# Patient Record
Sex: FEMALE | Race: BLACK OR AFRICAN AMERICAN | URBAN - METROPOLITAN AREA
[De-identification: names, ages, dates, MRNs, and addresses within clinical notes are randomized per-mention and may not be internally consistent; named-entity substitution may affect disease eponyms.]

---

## 2018-02-09 ENCOUNTER — HOSPITAL ENCOUNTER (OUTPATIENT)
Dept: LAB | Age: 23
Discharge: HOME | End: 2018-02-09
Payer: COMMERCIAL

## 2018-02-09 ENCOUNTER — IMPORTED ENCOUNTER (OUTPATIENT)
Dept: URBAN - METROPOLITAN AREA CLINIC 1 | Facility: CLINIC | Age: 23
End: 2018-02-09

## 2018-02-09 ENCOUNTER — OFFICE VISIT (OUTPATIENT)
Dept: OBGYN CLINIC | Age: 23
End: 2018-02-09

## 2018-02-09 VITALS
HEART RATE: 94 BPM | HEIGHT: 67 IN | BODY MASS INDEX: 40.98 KG/M2 | OXYGEN SATURATION: 100 % | WEIGHT: 261.1 LBS | TEMPERATURE: 97.9 F | SYSTOLIC BLOOD PRESSURE: 125 MMHG | DIASTOLIC BLOOD PRESSURE: 81 MMHG | RESPIRATION RATE: 18 BRPM

## 2018-02-09 DIAGNOSIS — Z11.3 SCREEN FOR STD (SEXUALLY TRANSMITTED DISEASE): ICD-10-CM

## 2018-02-09 DIAGNOSIS — Z01.419 ENCOUNTER FOR WELL WOMAN EXAM WITH ROUTINE GYNECOLOGICAL EXAM: Primary | ICD-10-CM

## 2018-02-09 DIAGNOSIS — Z87.820 H/O CONCUSSION: ICD-10-CM

## 2018-02-09 PROBLEM — H04.123: Noted: 2018-02-09

## 2018-02-09 PROBLEM — H40.013: Noted: 2018-02-09

## 2018-02-09 PROBLEM — G43.909: Noted: 2018-02-09

## 2018-02-09 PROCEDURE — 87491 CHLMYD TRACH DNA AMP PROBE: CPT | Performed by: OBSTETRICS & GYNECOLOGY

## 2018-02-09 PROCEDURE — 88175 CYTOPATH C/V AUTO FLUID REDO: CPT | Performed by: OBSTETRICS & GYNECOLOGY

## 2018-02-09 PROCEDURE — 86592 SYPHILIS TEST NON-TREP QUAL: CPT | Performed by: OBSTETRICS & GYNECOLOGY

## 2018-02-09 PROCEDURE — 92015 DETERMINE REFRACTIVE STATE: CPT

## 2018-02-09 PROCEDURE — 87389 HIV-1 AG W/HIV-1&-2 AB AG IA: CPT | Performed by: OBSTETRICS & GYNECOLOGY

## 2018-02-09 PROCEDURE — 87340 HEPATITIS B SURFACE AG IA: CPT | Performed by: OBSTETRICS & GYNECOLOGY

## 2018-02-09 PROCEDURE — 92250 FUNDUS PHOTOGRAPHY W/I&R: CPT

## 2018-02-09 PROCEDURE — 92014 COMPRE OPH EXAM EST PT 1/>: CPT

## 2018-02-09 NOTE — PATIENT INSTRUCTIONS
Well Visit, Ages 25 to 48: Care Instructions  Your Care Instructions    Physical exams can help you stay healthy. Your doctor has checked your overall health and may have suggested ways to take good care of yourself. He or she also may have recommended tests. At home, you can help prevent illness with healthy eating, regular exercise, and other steps. Follow-up care is a key part of your treatment and safety. Be sure to make and go to all appointments, and call your doctor if you are having problems. It's also a good idea to know your test results and keep a list of the medicines you take. How can you care for yourself at home? · Reach and stay at a healthy weight. This will lower your risk for many problems, such as obesity, diabetes, heart disease, and high blood pressure. · Get at least 30 minutes of physical activity on most days of the week. Walking is a good choice. You also may want to do other activities, such as running, swimming, cycling, or playing tennis or team sports. Discuss any changes in your exercise program with your doctor. · Do not smoke or allow others to smoke around you. If you need help quitting, talk to your doctor about stop-smoking programs and medicines. These can increase your chances of quitting for good. · Talk to your doctor about whether you have any risk factors for sexually transmitted infections (STIs). Having one sex partner (who does not have STIs and does not have sex with anyone else) is a good way to avoid these infections. · Use birth control if you do not want to have children at this time. Talk with your doctor about the choices available and what might be best for you. · Protect your skin from too much sun. When you're outdoors from 10 a.m. to 4 p.m., stay in the shade or cover up with clothing and a hat with a wide brim. Wear sunglasses that block UV rays. Even when it's cloudy, put broad-spectrum sunscreen (SPF 30 or higher) on any exposed skin.   · See a dentist one or two times a year for checkups and to have your teeth cleaned. · Wear a seat belt in the car. · Drink alcohol in moderation, if at all. That means no more than 2 drinks a day for men and 1 drink a day for women. Follow your doctor's advice about when to have certain tests. These tests can spot problems early. For everyone  · Cholesterol. Have the fat (cholesterol) in your blood tested after age 21. Your doctor will tell you how often to have this done based on your age, family history, or other things that can increase your risk for heart disease. · Blood pressure. Have your blood pressure checked during a routine doctor visit. Your doctor will tell you how often to check your blood pressure based on your age, your blood pressure results, and other factors. · Vision. Talk with your doctor about how often to have a glaucoma test.  · Diabetes. Ask your doctor whether you should have tests for diabetes. · Colon cancer. Have a test for colon cancer at age 48. You may have one of several tests. If you are younger than 48, you may need a test earlier if you have any risk factors. Risk factors include whether you already had a precancerous polyp removed from your colon or whether your parent, brother, sister, or child has had colon cancer. For women  · Breast exam and mammogram. Talk to your doctor about when you should have a clinical breast exam and a mammogram. Medical experts differ on whether and how often women under 50 should have these tests. Your doctor can help you decide what is right for you. · Pap test and pelvic exam. Begin Pap tests at age 24. A Pap test is the best way to find cervical cancer. The test often is part of a pelvic exam. Ask how often to have this test.  · Tests for sexually transmitted infections (STIs). Ask whether you should have tests for STIs. You may be at risk if you have sex with more than one person, especially if your partners do not wear condoms.   For men  · Tests for sexually transmitted infections (STIs). Ask whether you should have tests for STIs. You may be at risk if you have sex with more than one person, especially if you do not wear a condom. · Testicular cancer exam. Ask your doctor whether you should check your testicles regularly. · Prostate exam. Talk to your doctor about whether you should have a blood test (called a PSA test) for prostate cancer. Experts differ on whether and when men should have this test. Some experts suggest it if you are older than 39 and are -American or have a father or brother who got prostate cancer when he was younger than 72. When should you call for help? Watch closely for changes in your health, and be sure to contact your doctor if you have any problems or symptoms that concern you. Where can you learn more? Go to http://arpit-john.info/. Enter P072 in the search box to learn more about \"Well Visit, Ages 25 to 48: Care Instructions. \"  Current as of: May 12, 2017  Content Version: 11.4  © 8721-0862 [employer]. Care instructions adapted under license by [employer] (which disclaims liability or warranty for this information). If you have questions about a medical condition or this instruction, always ask your healthcare professional. [place] [address] any warranty or liability for your use of this information. Postconcussion Syndrome: Care Instructions  Your Care Instructions    Postconcussion syndrome occurs after a blow to the head or body. Common symptoms are changes in the ability to concentrate, think, remember, or solve problems. Symptoms, which may include headaches, personality changes, and dizziness, may be related to stress from the events surrounding the accident that caused the injury. Follow-up care is a key part of your treatment and safety.  Be sure to make and go to all appointments, and call your doctor if you are having problems. It's also a good idea to know your test results and keep a list of the medicines you take. How can you care for yourself at home? Pain  · Rest is the best treatment for postconcussion syndrome. · Do not drive if you have taken a prescription pain medicine. · Rest in a quiet, dark room until your headache is gone. Close your eyes and try to relax or go to sleep. Do not watch TV or read. · Put a cold, moist cloth or cold pack on the painful area for 10 to 20 minutes at a time. Put a thin cloth between the cold pack and your skin. · Have someone gently massage your neck and shoulders. · Take your medicines exactly as prescribed. Call your doctor if you think you are having a problem with your medicine. You will get more details on the specific medicines your doctor prescribes. Stress  · Try to reduce stress. Some ways to do this include:  ¨ Taking slow, deep breaths. ¨ Soaking in a warm bath. ¨ Listening to soothing music. ¨ Taking a yoga class. ¨ Having a massage or back rub. ¨ Drinking a warm, nonalcoholic, noncaffeinated beverage. · Get enough sleep. · Eat a healthy, balanced diet. A balanced diet includes whole grains, dairy, fruits and vegetables, and protein. Eat a variety of foods from each of those groups so you get all the nutrients you need. · Avoid alcohol and illegal drugs. · Try relaxation exercises, such as breathing and muscle relaxation exercises. · Talk to your doctor about counseling. It may help you deal with stress from your accident. When should you call for help? Watch closely for changes in your health, and be sure to contact your doctor if:  ? · You do not get better as expected. ? · Your symptoms, such as headaches, trouble concentrating, or changes in mood, get worse. Where can you learn more? Go to http://arpit-john.info/. Enter R633 in the search box to learn more about \"Postconcussion Syndrome: Care Instructions. \"  Current as of: October 14, 2016  Content Version: 11.4  © 2328-5441 [employer]. Care instructions adapted under license by [employer] (which disclaims liability or warranty for this information). If you have questions about a medical condition or this instruction, always ask your healthcare professional. [place] [address] any warranty or liability for your use of this information.

## 2018-02-09 NOTE — PROGRESS NOTES
Name: Pedro Méndez MRN: [id number] No obstetric history on file. YOB: 1995  Age: 23 y.o. Sex: female        Chief Complaint   Patient presents with    Well Woman     with STD testing       HPI  Denies depression, states she used to feel depressed, but started praying and going [employer], and feels better. Denies SI/HI  Does not eat a well balanced diet. She started a pescatarian diet this week, and is actively trying to loose weight. Does not exercise, but starting to count her steps. Has new watch. Denies domestic abuse   Last tdap   Flu vaccine discussed, declined. States the last one in  made her sick  Pt on Nuvaring since 2017, has had regular periods prior to the [address]. She states her dysmenorrhea and HMB are better now that she is on the nuvaring. H/o Brain Concussion: pt states she fell down a flight of stairs 2017 and hit her head. She was supposed to follow up with a nuerologist afterwards but was loss to follow up. She  Now c/o intermittent headaches. And pain and swelling in the area where she hit her heard. She is trying to find a nuerologist to see. Denies n/v, vision changes, numbness or tingling in the hands or feet or weakness in her extremities. Pt also desires STD testing.      OB History      Para Term  AB Living    2    2     SAB TAB Ectopic Molar Multiple Live Births     0            Obstetric Comments    Patient's last menstrual period was 2017 (approximate) on Nuvaring  Periods regular, last 7 days, flow heavy, severe dysmenorrhea  History of sexually transmitted infections: h/o GC  s/p FILOMENA  Last pap 2017 per pt was wnl             History   Sexual Activity    Sexual activity: Not Currently    Partners: Male    Birth control/ protection: Inserts     Comment: nuvaring       Past Medical History:   Diagnosis Date    Concussion 2017    GERD (gastroesophageal reflux disease)        Past Surgical History:   Procedure Laterality Date    HX DILATION AND CURETTAGE  2016    surgical     HX WISDOM TEETH EXTRACTION      REV UPPER EYELID W EXCESS SKIN  2017    removal of chalazion       No Known Allergies    No current outpatient prescriptions on file prior to visit. No current facility-administered medications on file prior to visit. Social History     Social History    Marital status: SINGLE     Spouse name: N/A    Number of children: N/A    Years of education: N/A     Occupational History    Not on file. Social History Main Topics    Smoking status: Never Smoker    Smokeless tobacco: Never Used    Alcohol use Yes    Drug use: Yes     Special: Marijuana      Comment: last used 16    Sexual activity: Not Currently     Partners: Male     Birth control/ protection: Inserts      Comment: nuvaring     Other Topics Concern    Not on file     Social History Narrative    No narrative on file       Family History   Problem Relation Age of Onset    Asthma Brother     Hypertension Maternal Grandmother     Stroke Paternal Grandmother     Hypertension Paternal Grandmother     Cancer Paternal Grandmother 76     breast       Review of Systems   Constitutional: Negative. HENT: Negative. Eyes: Negative. Respiratory: Negative. Cardiovascular: Negative. Gastrointestinal: Negative. Genitourinary: Negative. Musculoskeletal: Negative. Neurological: Negative. Endo/Heme/Allergies: Negative. Visit Vitals    /81 (BP 1 Location: Left arm, BP Patient Position: Sitting)    Pulse 94    Temp 97.9 °F (36.6 °C) (Oral)    Resp 18    Ht 5' 7\" (1.702 m)    Wt 261 lb 1.6 oz (118.4 kg)    LMP 2017 (Approximate)    SpO2 100%    BMI 40.89 kg/m2       GENERAL:  Well developed, well nourished, in no distress  NEURO/PSYCHE: Grossly intact, normal mood and affect  HEENT: Normal cephalic, atraumatic, good dentition, neck supple.  No thyromegaly  BREASTS: breasts appear normal, no suspicious masses, no skin or nipple changes  CV: regular rate and rhythm  LUNGS: clear to auscultation bilaterally, no wheezes, rhonchi or rales, good air entry with normal effort  ABDOMEN: + BS, soft without tenderness, no guarding, rebound or masses  EXTREMITIES: no edema or erythema noted  SKIN:  Warm, dry, no lesions      PELVIC EXAM:  LABIA MAJORA: no masses, tenderness or lesions  LABIA MINORA: no masses, tenderness or lesions  CLITORIS: no masses, tenderness or lesions  URETHRA: normal appearing, no masses or tenderness  BLADDER: no fullness or tenderness  VAGINA: pink appearing vagina with physiologic discharge, no lesions   PERINEUM: no masses, tenderness or lesions  CERVIX: No CMT or lesions  UTERUS: small, mobile, nontender  ADNEXA: nontender and no masses    1. Encounter for well woman exam with routine gynecological exam  - PAP IG, CT-NG TV [address] HPV [id number], [id number]); Future  Reviewed diet, weight loss, exercise, and domestic abuse. Encouraged condom use every time. Reviewed tetanus vaccine. All of her questions were discussed. 2. Screen for STD (sexually transmitted disease)    - PAP IG, CT-NG TV [address] HPV [id number], [id number]); Future  - HEP B SURFACE AG; Future  - HIV 1/2 AG/AB, 4TH GENERATION,W RFLX CONFIRM; Future  - RPR; Future    3.  H/O concussion  Will refer to PCP    F/U 2weeks prn  Will call patient if results abnormal

## 2018-02-09 NOTE — PATIENT DISCUSSION
1.  Migraine w/ Aura - Reassurance and explanation was given to patient. Patient recently fell and hit head diagnosed with concussion. Had a CT Scan never had follow up with Neuro. Advised to follow up with Neurologist and PCP. Advised patient of migraine triggers such as stress caffeine dietary components such as MSG. No signs of ocular damage noted on today's exam. 2.  Glaucoma Suspect OU (CD 0.65/0.60): IOP 14 OU. Unknown family hx. Patient is considered Low Risk. Condition was discussed with patient and patient understands. Will continue to monitor patient for any progression in condition. Patient was advised to call us with any problems questions or concerns. 3.  Dry Eyes OU -- Recommended to patient to use Artificial Tears BID OURefraction fee discussed and pt agrees to non-covered serviceReturn for an appointment in 1 month 10/OCT/HVF with Dr. Franny Lopez.

## 2018-02-10 LAB — RPR SER QL: NONREACTIVE

## 2018-02-12 ENCOUNTER — TELEPHONE (OUTPATIENT)
Dept: OBGYN CLINIC | Age: 23
End: 2018-02-12

## 2018-02-12 LAB
C TRACH RRNA SPEC QL NAA+PROBE: POSITIVE
HBV SURFACE AG SER QL: <0.1 INDEX
HBV SURFACE AG SER QL: NEGATIVE
HIV 1+2 AB+HIV1 P24 AG SERPL QL IA: NONREACTIVE
HIV12 RESULT COMMENT, HHIVC: NORMAL
N GONORRHOEA RRNA SPEC QL NAA+PROBE: NEGATIVE
SPECIMEN SOURCE: ABNORMAL
T VAGINALIS RRNA SPEC QL NAA+PROBE: NEGATIVE

## 2018-02-12 NOTE — TELEPHONE ENCOUNTER
MsOmar Nils Flores called about her [facility] medication. She called the pharmacy and they did not have the order.

## 2019-02-19 ENCOUNTER — IMPORTED ENCOUNTER (OUTPATIENT)
Dept: URBAN - METROPOLITAN AREA CLINIC 1 | Facility: CLINIC | Age: 24
End: 2019-02-19

## 2019-02-19 PROBLEM — H52.13: Noted: 2019-02-19

## 2019-02-19 PROCEDURE — S0621 ROUTINE OPHTHALMOLOGICAL EXA: HCPCS

## 2019-02-19 NOTE — PATIENT DISCUSSION
1. Myopia: Rx was given for correction if indicated and requested. 2. BRITTANY OU-REC patient to start using AT BID OU 3. COAG suspect-OU-(0.60/0.65) IOP was 15/15. FM HX.  4.  Return for an appointment in 1 year for 40. with Dr. Hay Nowak. 5.  Return for an appointment in 1 month for 30 OCT and VF 24-2 with Dr. Hay Nowak.

## 2019-04-10 ENCOUNTER — IMPORTED ENCOUNTER (OUTPATIENT)
Dept: URBAN - METROPOLITAN AREA CLINIC 1 | Facility: CLINIC | Age: 24
End: 2019-04-10

## 2019-04-10 PROBLEM — H40.023: Noted: 2019-04-10

## 2019-04-10 PROBLEM — H04.123: Noted: 2019-04-10

## 2019-04-10 PROCEDURE — 92083 EXTENDED VISUAL FIELD XM: CPT

## 2019-04-10 PROCEDURE — 92133 CPTRZD OPH DX IMG PST SGM ON: CPT

## 2019-04-10 PROCEDURE — 92014 COMPRE OPH EXAM EST PT 1/>: CPT

## 2019-04-10 NOTE — PATIENT DISCUSSION
1.  Glaucoma Suspect OU (CD: 0.65 / 0.60) -- IOP 15 OU today. AA. Positive Family h/o Glaucoma. OCT & 24-2 HVF today shows WNL OU. Patient is considered high risk. Condition was discussed with patient and patient understands. Will continue to monitor patient for any progression in condition. Patient was advised to call us with any problems questions or concerns. 2.  Dry Eyes OU -- Recommend continue the frequent use of OTC AT's BID-QID OU Routinely. 3. H/o Migraine w/ Aura -- Resolved. Return for an appointment in 1 YR for a 30 / OCT OU (if still local to area) with Dr. Ana Green.

## 2020-04-17 ENCOUNTER — IMPORTED ENCOUNTER (OUTPATIENT)
Dept: URBAN - METROPOLITAN AREA CLINIC 1 | Facility: CLINIC | Age: 25
End: 2020-04-17

## 2020-04-17 PROBLEM — H04.123: Noted: 2020-04-17

## 2020-04-17 PROBLEM — H40.013: Noted: 2020-04-17

## 2020-04-17 PROCEDURE — 92133 CPTRZD OPH DX IMG PST SGM ON: CPT

## 2020-04-17 PROCEDURE — 92014 COMPRE OPH EXAM EST PT 1/>: CPT

## 2020-04-17 NOTE — PATIENT DISCUSSION
H/o of migraines OU: Pt is pregnant and headaches more frequent.   Photophobia likely due to migraines

## 2020-04-17 NOTE — PATIENT DISCUSSION
1.  Glaucoma Suspect OU :   Condition was discussed with patient and patient understands. Will continue to monitor patient for any progression in condition. Patient was advised to call us with any problems questions or concerns. 2.  Dry Eyes OU -The use/continuation of artificial tears were recommended. 3.  H/o of migraines OU: Pt is pregnant and headaches more frequent. Photophobia likely due to migraines4. Return for an appointment in 1 year for 30 with Dr. Selena Steven.

## 2022-04-03 ASSESSMENT — VISUAL ACUITY
OD_CC: 20/20
OS_CC: 20/20
OD_CC: 20/20
OD_SC: J1+
OS_CC: 20/20
OD_SC: J1+
OD_CC: 20/20
OS_CC: 20/20
OD_CC: 20/20
OS_CC: 20/20
OS_SC: J1+
OD_SC: J1+
OS_SC: J1+
OS_SC: J1+

## 2022-04-03 ASSESSMENT — TONOMETRY
OS_IOP_MMHG: 14
OS_IOP_MMHG: 14
OD_IOP_MMHG: 15
OD_IOP_MMHG: 14
OS_IOP_MMHG: 15
OD_IOP_MMHG: 14
OS_IOP_MMHG: 15
OD_IOP_MMHG: 15